# Patient Record
Sex: MALE | Race: OTHER | HISPANIC OR LATINO
[De-identification: names, ages, dates, MRNs, and addresses within clinical notes are randomized per-mention and may not be internally consistent; named-entity substitution may affect disease eponyms.]

---

## 2023-04-01 PROBLEM — Z00.00 ENCOUNTER FOR PREVENTIVE HEALTH EXAMINATION: Status: ACTIVE | Noted: 2023-04-01

## 2023-04-14 ENCOUNTER — APPOINTMENT (OUTPATIENT)
Dept: GASTROENTEROLOGY | Facility: CLINIC | Age: 58
End: 2023-04-14
Payer: COMMERCIAL

## 2023-04-14 VITALS
BODY MASS INDEX: 29.09 KG/M2 | WEIGHT: 181 LBS | HEART RATE: 75 BPM | OXYGEN SATURATION: 98 % | SYSTOLIC BLOOD PRESSURE: 110 MMHG | RESPIRATION RATE: 18 BRPM | DIASTOLIC BLOOD PRESSURE: 70 MMHG | HEIGHT: 66 IN

## 2023-04-14 DIAGNOSIS — R93.89 ABNORMAL FINDINGS ON DIAGNOSTIC IMAGING OF OTHER SPECIFIED BODY STRUCTURES: ICD-10-CM

## 2023-04-14 DIAGNOSIS — Z12.11 ENCOUNTER FOR SCREENING FOR MALIGNANT NEOPLASM OF COLON: ICD-10-CM

## 2023-04-14 DIAGNOSIS — R14.0 ABDOMINAL DISTENSION (GASEOUS): ICD-10-CM

## 2023-04-14 PROCEDURE — 99204 OFFICE O/P NEW MOD 45 MIN: CPT

## 2023-04-14 NOTE — ASSESSMENT
[FreeTextEntry1] : - Colon cancer screening - colonoscopy due - Colonoscopy scheduled - Risks, benefits, alternatives were discussed, including but not limited to bleeding, infection, perforation and sedation risks. Additionally, the possibility of missed lesions was conveyed.\par \par -Bloating - possibly functional - r/o ugi pathology, celiac - EGD planned\par \par - abnl CT - enteritis likely infx, self-limited, but advised f/u with PMD or  RE: prostate findings\par \par PMD/consultation/hospital notes and Labs/imaging/prior endoscopic results reviewed to extent noted in HPI; and, if procedure code billed on this visit for lab draw, this serves to signify that labs were drawn here in this office.\par

## 2023-04-14 NOTE — CONSULT LETTER
[FreeTextEntry1] : Dear Dr. AMEYA GAMBOA ,\par \par I had the pleasure of evaluating your patient,  ALBERT PHILLIPS.\par \par Please refer to my note below.\par \par Thank you very much for allowing me to participate in the care of this patient.  If you have any questions, please do not hesitate to contact me.\par \par Sincerely, \par \par Nicholas Zavaleta MD\par

## 2023-04-14 NOTE — HISTORY OF PRESENT ILLNESS
[FreeTextEntry1] : 57m no sig PMH presenting for colon cancer screening. Pt denies abdominal pain, rectal bleeding, change in bowel habits, or unexplained weight loss.  \par \par Last colonoscopy:\par none\par \par States he also has nonspecific triggers with postprandial bloating in upper abdomen.  No diarrhea/constipation/n/v\par \par He was in ER 4/1/23 with n/v abd pain dxd with "enteritis" - those symptoms lasted about 3d.  He feels well now. \par In ER: cbc w/ hb 13.9, normal wbc, cmet lipse lactate otherwise.\par CT+: thick SB loops, nonspec enhancing focus prostate - correlate psa levels.  Appendix 6mm but no inflammatory changes to suggest appendicitis.\par \par Soc:  no tobacco or significant EtOH\par FHx: no FHx GI malignancy or IBD\par \par ROS:\par Constitutional:: no weight loss, fevers\par ENT: no deafness\par Eyes: not blind\par Neck: no LN\par Chest: no dyspnea/cough\par Cardiac: no chest pain\par Vascular: no leg swelling\par GI: no abdominal pain, nausea, vomiting, diarrhea, constipation, rectal bleeding, dysphagia, melena unless otherwise noted in HPI\par : no dysuria, dark urine\par Skin: no rashes, jaundice\par Heme: no bleeding\par Endocrine: no DM unless otherwise stated in HPI\par \par Px: (VS noted below)\par General: NAD\par Eyes: anicteric\par Oropharynx:  clear\par Neck: no LN\par Chest: normal respiratory effort\par CVS: regular\par Abd: soft, NT, ND, +BS, no HSM\par Ext: no atrophy\par Neuro: grossly nonfocal\par \par Labs/imaging/prior endoscopic results reviewed to the extent available and noted in HPI\par

## 2023-04-14 NOTE — REASON FOR VISIT
[Consultation] : a consultation visit [FreeTextEntry1] : Kindly asked by Dr. Salas  to consult and evaluate patient for       dyspepsia/bloating, colon screening\par A copy of this note is being sent to physician requesting consultation.

## 2023-05-04 ENCOUNTER — APPOINTMENT (OUTPATIENT)
Dept: GASTROENTEROLOGY | Facility: HOSPITAL | Age: 58
End: 2023-05-04

## 2024-09-03 ENCOUNTER — TRANSCRIPTION ENCOUNTER (OUTPATIENT)
Age: 59
End: 2024-09-03

## 2024-10-22 ENCOUNTER — APPOINTMENT (OUTPATIENT)
Dept: NEUROSURGERY | Facility: CLINIC | Age: 59
End: 2024-10-22
Payer: COMMERCIAL

## 2024-10-22 DIAGNOSIS — I72.9 ANEURYSM OF UNSPECIFIED SITE: ICD-10-CM

## 2024-10-22 PROCEDURE — 99205 OFFICE O/P NEW HI 60 MIN: CPT

## 2024-10-29 PROBLEM — I72.9 ANEURYSM: Status: ACTIVE | Noted: 2024-10-29
